# Patient Record
Sex: FEMALE | Race: WHITE | URBAN - METROPOLITAN AREA
[De-identification: names, ages, dates, MRNs, and addresses within clinical notes are randomized per-mention and may not be internally consistent; named-entity substitution may affect disease eponyms.]

---

## 2017-10-27 PROBLEM — F33.2 SEVERE EPISODE OF RECURRENT MAJOR DEPRESSIVE DISORDER (HCC): Status: ACTIVE | Noted: 2017-10-27

## 2018-07-03 ENCOUNTER — OFFICE VISIT (OUTPATIENT)
Dept: CARDIOLOGY CLINIC | Age: 63
End: 2018-07-03

## 2018-07-03 VITALS
SYSTOLIC BLOOD PRESSURE: 120 MMHG | BODY MASS INDEX: 25.49 KG/M2 | DIASTOLIC BLOOD PRESSURE: 70 MMHG | WEIGHT: 158.6 LBS | HEIGHT: 66 IN | RESPIRATION RATE: 16 BRPM | HEART RATE: 58 BPM | OXYGEN SATURATION: 98 %

## 2018-07-03 DIAGNOSIS — R07.89 CHEST TIGHTNESS: ICD-10-CM

## 2018-07-03 DIAGNOSIS — E78.00 HYPERCHOLESTEREMIA: ICD-10-CM

## 2018-07-03 DIAGNOSIS — R00.2 PALPITATIONS: ICD-10-CM

## 2018-07-03 DIAGNOSIS — R00.0 RAPID HEART BEAT: Primary | ICD-10-CM

## 2018-07-03 NOTE — MR AVS SNAPSHOT
[facility] [address] [place] [address] 
[phone] Patient: Gigi Macias MRN: [id number] LORENA:[id number] Visit Information Date & Time Provider Department Dept. Phone Encounter #  
 7/3/2018  9:00 AM Robles Woodson MD [facility] [phone] [id number] Your Appointments 2/19/2019  1:00 PM  
ESTABLISHED PATIENT with MD Nataliya Padgett TURNER, [address] [place] [address] ([facility]) Appt Note: Annual  
 [address] [facility] [address]  
[phone]  
  
   
 [address] [facility] [address] [address] Upcoming Health Maintenance Date Due DTaP/Tdap/Td series (1 - Tdap) 12/25/1976 ZOSTER VACCINE AGE 60> 10/25/2015 Influenza Age 5 to Adult 8/1/2018 BREAST CANCER SCRN MAMMOGRAM 1/16/2019 FOBT Q 1 YEAR, 18+ 2/20/2019 PAP AKA CERVICAL CYTOLOGY 2/20/2021 Allergies as of 7/3/2018  Review Complete On: 7/3/2018 By: Robles Woodson MD  
 No Known Allergies Current Immunizations  Reviewed on 2/20/2018 Name Date Influenza Vaccine 10/9/2017 Not reviewed this visit You Were Diagnosed With   
  
 Codes Comments Rapid heart beat    -  Primary ICD-10-CM: R00.0 ICD-9-CM: 785.0 Palpitations     ICD-10-CM: R00.2 ICD-9-CM: 785.1 Chest tightness     ICD-10-CM: R07.89 ICD-9-CM: 786.59 Vitals BP Pulse Resp Height(growth percentile) Weight(growth percentile) SpO2  
 120/70 (BP 1 Location: Left arm, BP Patient Position: Sitting) (!) 58 16 5' 5.5\" (1.664 m) 158 lb 9.6 oz (71.9 kg) 98% BMI OB Status Smoking Status 25.99 kg/m2 Postmenopausal Former Smoker BMI and BSA Data Body Mass Index Body Surface Area  
 25.99 kg/m 2 1.82 m 2 Preferred Pharmacy Pharmacy Name Phone [id number] [phone] Your Updated Medication List  
  
   
 This list is accurate as of 7/3/18  9:59 AM.  Always use your most recent med list.  
  
  
  
  
 citalopram 20 mg tablet Commonly known as:  Lenward Campanile Take 1 Tab by mouth daily. zolpidem 5 mg tablet Commonly known as:  AMBIEN Take 1 Tab by mouth nightly as needed. We Performed the Following AMB POC EKG ROUTINE W/ 12 LEADS, INTER & REP [87432 CPT(R)] To-Do List   
 07/03/2018 ECG:  ECG HOLTER MONITOR, UP TO 48 HRS   
  
 07/16/2018 ECHO:  ECHO TTE STRESS COMP W OR WO CONTR Patient Instructions You will be scheduled for a Stress Echo after your appointment today. Please schedule between 7/16 and 7/26. We will call you with the results. Please wear comfortable clothing (shorts or pants with a shirt or blouse) and walking/athletic shoes. Do not eat or drink anything, except water, for at least 2 hours prior to your test. 
 
Do take your scheduled medications prior to your test. A 48 hour Holter monitor has been ordered for you. Please schedule for next available appointment. Introducing [facility]! [employer] introduces [employer] patient portal. Now you can access parts of your medical record, email your doctor's office, and request medication refills online. 1. In your internet browser, go to [email or web address] 2. Click on the First Time User? Click Here link in the Sign In box. You will see the New Member Sign Up page. 3. Enter your [employer] Access Code exactly as it appears below. You will not need to use this code after youve completed the sign-up process. If you do not sign up before the expiration date, you must request a new code. · [employer] Access Code: [email or web address] Expires: 9/24/2018 10:05 AM 
 
4. Enter the last four digits of your Social Security Number (xxxx) and Date of Birth (mm/dd/yyyy) as indicated and click Submit. You will be taken to the next sign-up page. 5. Create a [employer] ID. This will be your [employer] login ID and cannot be changed, so think of one that is secure and easy to remember. 6. Create a [employer] password. You can change your password at any time. 7. Enter your Password Reset Question and Answer. This can be used at a later time if you forget your password. 8. Enter your e-mail address. You will receive e-mail notification when new information is available in [address] 9. Click Sign Up. You can now view and download portions of your medical record. 10. Click the Download Summary menu link to download a portable copy of your medical information. If you have questions, please visit the Frequently Asked Questions section of the [employer] website. Remember, [employer] is NOT to be used for urgent needs. For medical emergencies, dial 911. Now available from your iPhone and Android! Please provide this summary of care documentation to your next provider. Your primary care clinician is listed as Radha Wilson. If you have any questions after today's visit, please call [phone].

## 2018-07-03 NOTE — PATIENT INSTRUCTIONS
You will be scheduled for a Stress Echo after your appointment today. Please schedule between 7/16 and 7/26. We will call you with the results. Please wear comfortable clothing (shorts or pants with a shirt or blouse) and walking/athletic shoes. Do not eat or drink anything, except water, for at least 2 hours prior to your test.    Do take your scheduled medications prior to your test.      A 48 hour Holter monitor has been ordered for you. Please schedule for next available appointment.

## 2018-07-15 PROBLEM — E78.00 HYPERCHOLESTEREMIA: Status: ACTIVE | Noted: 2018-07-15

## 2018-07-15 NOTE — PROGRESS NOTES
[facility] DirkLevering     1955       Steffi Garcias MD, [facility] [place] [facility]    PCP is Rafa Merino MD   [facility] [place]  [facility]  HPI:  Date of Visit- 7-3-18  Lux Peterson is a 58 y.o. female   Subjective:  Patient has had chest tightness, rapid heartbeat and coughing. She sees Dr. Wilber Campuzano. Patient has seen Dr. Jay 18 with history of mild depression. At that time was on Ambien and Celexa as needed. EKG 18 shows sinus bradycardia, within normal limits. She describes chest pain as a tightness. She describes the rapid heartbeat as occurring with coughing and often occurs at random parts of the day. The coughing though seems to be occurring more often and the palpitations have turned now into tachycardia, where it is a longer period. She said she would get about 2-4 of these in a day and typically would feel them over a 48 hour period. Chest tightness is like a pressure feeling along the left side of the chest, right side of the chest, lasting several minutes, sometimes with exertion, other times not. Sometimes occurring at night. It is not associated with coughing. It is dull in symptoms. Medical History:   in 12 in [place]. No prior cath. Social History:  Nonsmoker. Positive for alcohol, two glasses of wine on the weekends. One cup of coffee. One child, adopted. Works as . Is . Likes to balk, walk, read and paint. Family History: Mother is 80 with CHF, stroke, blind and diabetes. Father is 80 with mild stroke and arthritis. One sister 68with glaucoma. ROS:  See below. Positive for shortness of breath, chest pain, irregular heartbeat, fast heartbeat. Allergies:  None. Medications:  Patient is currently on no meds. Assessment/Plan:   Patient has a chest tightness and I think would benefit from evaluation with stress echocardiogram.  It is a pressure-like feeling.   It is associated with shortness of breath. Additionally she has tachycardia and  this is persistent so plan a Holter monitor. 1. Rapid heart beat    2. Palpitations    3. Chest tightness    4. Hypercholesteremia       cardiovascular disease risk factors lipids are moderate  No HTN, Family Hx, no recent smoking, not sedentary or obese  Future Appointments  Date Time Provider Wale Nicole   7/23/2018 11:00 AM ECHO, [address]   7/23/2018 11:00 AM STRESSECHO, [address]   7/23/2018 11:30 AM HOLTER, [address]   2/19/2019 1:00 PM Julia Ritchie  [facility]      Patient Instructions   You will be scheduled for a Stress Echo after your appointment today. Please schedule between 7/16 and 7/26. We will call you with the results. Please wear comfortable clothing (shorts or pants with a shirt or blouse) and walking/athletic shoes. Do not eat or drink anything, except water, for at least 2 hours prior to your test.    Do take your scheduled medications prior to your test.      A 48 hour Holter monitor has been ordered for you. Please schedule for next available appointment. Key CAD CHF Meds     Patient is on no cardiovascular meds. Review of Systems   Constitutional: Negative for diaphoresis, fever and malaise/fatigue. HENT: Negative for ear pain, hearing loss, nosebleeds and tinnitus. Eyes: Negative for blurred vision, double vision and pain. Respiratory: Positive for shortness of breath. Negative for cough, hemoptysis, sputum production, wheezing and stridor. Cardiovascular: Positive for chest pain and palpitations. Negative for orthopnea, claudication and leg swelling. Gastrointestinal: Negative for abdominal pain, blood in stool, constipation, diarrhea, heartburn, melena, nausea and vomiting. Genitourinary: Negative for dysuria, frequency and urgency.    Musculoskeletal: Negative for back pain, falls, joint pain, myalgias and neck pain.   Skin: Negative for rash. Neurological: Negative for dizziness, sensory change, seizures, loss of consciousness, weakness and headaches. Endo/Heme/Allergies: Does not bruise/bleed easily. Psychiatric/Behavioral: Negative for depression, hallucinations and memory loss. The patient is not nervous/anxious and does not have insomnia. see supplement sheet, initialed and to be scanned by staff  Past Medical History:   Diagnosis Date    Severe episode of recurrent major depressive disorder ([facility] [address] 10/27/2017      Social Hx= reports that she quit smoking about 36 years ago. Her smoking use included Cigarettes. She smoked 0.50 packs per day. She has never used smokeless tobacco. She reports that she drinks about 3.5 oz of alcohol per week      Exam and Labs:    Visit Vitals    /70 (BP 1 Location: Left arm, BP Patient Position: Sitting)    Pulse (!) 58    Resp 16    Ht 5' 5.5\" (1.664 m)    Wt 158 lb 9.6 oz (71.9 kg)    SpO2 98%    BMI 25.99 kg/m2      Constitutional:  NAD, comfortable  Head: NC,AT. Eyes: No scleral icterus. Neck:  Neck supple. No JVD present. Throat: moist mucous membranes. Chest: Effort normal & normal respiratory excursion . Neurological: alert, conversant and oriented . Skin: Skin is not cold. No obvious systemic rash noted. Not diaphoretic. No erythema. Psychiatric:  Grossly normal mood and affect. Behavior appears normal. Extremities:  no clubbing or cyanosis. Abdomen: non distended    Lungs:breath sounds normal. No stridor. distress, wheezes or  Rales. Heart:    normal rate, regular rhythm, normal S1, S2, no murmurs, rubs, clicks or gallops , PMI non displaced. Edema: Edema is none. Lab Results   Component Value Date/Time    Cholesterol, total 224 (H) 09/23/2015 02:06 PM    HDL Cholesterol 73 09/23/2015 02:06 PM    LDL, calculated 135 (H) 09/23/2015 02:06 PM    Triglyceride 78 09/23/2015 02:06 PM     No results found for this or any previous visit.    Lab Results Component Value Date/Time    Sodium 144 02/20/2018 01:35 PM    Potassium 4.4 02/20/2018 01:35 PM    Chloride 104 02/20/2018 01:35 PM    CO2 23 02/20/2018 01:35 PM    Glucose 91 02/20/2018 01:35 PM    BUN 19 02/20/2018 01:35 PM    Creatinine 0.76 02/20/2018 01:35 PM    BUN/Creatinine ratio 25 02/20/2018 01:35 PM    GFR est AA 97 02/20/2018 01:35 PM    GFR est non-AA 84 02/20/2018 01:35 PM    Calcium 9.1 02/20/2018 01:35 PM      Wt Readings from Last 3 Encounters:   07/03/18 158 lb 9.6 oz (71.9 kg)   02/20/18 162 lb (73.5 kg)   10/27/17 162 lb (73.5 kg)      BP Readings from Last 3 Encounters:   07/03/18 120/70   02/20/18 110/80   10/27/17 110/80        Current Outpatient Prescriptions   Medication Sig    zolpidem (AMBIEN) 5 mg tablet Take 1 Tab by mouth nightly as needed.  citalopram (CELEXA) 20 mg tablet Take 1 Tab by mouth daily. No current facility-administered medications for this visit. Impression see above.

## 2022-03-19 PROBLEM — F33.2 SEVERE EPISODE OF RECURRENT MAJOR DEPRESSIVE DISORDER (HCC): Status: ACTIVE | Noted: 2017-10-27

## 2022-03-20 PROBLEM — E78.00 HYPERCHOLESTEREMIA: Status: ACTIVE | Noted: 2018-07-15

## 2023-05-19 RX ORDER — CITALOPRAM 20 MG/1
1 TABLET ORAL DAILY
COMMUNITY
Start: 2020-06-24

## 2023-05-19 RX ORDER — ZOLPIDEM TARTRATE 5 MG/1
5 TABLET ORAL
COMMUNITY
Start: 2023-03-01